# Patient Record
(demographics unavailable — no encounter records)

---

## 2024-11-27 NOTE — HISTORY OF PRESENT ILLNESS
[FreeTextEntry1] : Patient presents for followup on hypertension/hyperlipidemia/ASHD/PAF. Patient is currently on benazepril/Nifedipine/metopolol for hypertension, on Crestor for his hyperlipidemia, is trying to control/improve his sugar dietarily, is on ASA for ASHD and on Eliquis for PAF. -s/p BCC removal -turned 71 y/o today

## 2024-11-27 NOTE — ASSESSMENT
[FreeTextEntry1] : HD Flu vaccine given without side effects or reaction.Venipuncture done in our office, Labs sent out and further recommendations will be made based on lab results.Patient advised to continue present medications with diet/exercise and specialist followup.Patient will return to the office in 3-4months   discussed Shingrix/TDAP with patient, +got covid vaccines colonoscopy 1/2022 with next in 3 years endoscopy 1/2022 specialists include.. 1. cardiology-Dr. Pineda 2. podiatry-Dr. Lamb 3.vascular-Dr. mercado 4.derm-Dr. Jasbir ngo 5. ophthalmology-Site MD 6.orthopedic p.r.n.-Dr. Alvarez 7.Endocrine-Dr. Hutton 8.GI-Dr. Blandon 9.Cadiothoracic=Dr. Simons Carotid sonogram=DENNIS via vascular CIGS "+1/2 pack Q week-"/ETOH=none last CT of the chest was 6/2024 with follow-up in 6 months, jennifer ordering repeat CT chest 4/18 hep c screen Abdominal sonogram 6/2024 with fatty liver 24 hour urine 12/2022 echo via cardio

## 2025-02-10 NOTE — DATA REVIEWED
[FreeTextEntry1] : Independent review of imaging and independent interpretation was performed at today's visit. -1/22/25 CT chest lung cancer screening short term follow up through [facility]

## 2025-02-10 NOTE — DATA REVIEWED
[FreeTextEntry1] : Independent review of imaging and independent interpretation was performed at today's visit. -1/22/25 CT chest lung cancer screening short term follow up through [facility]

## 2025-02-10 NOTE — HISTORY OF PRESENT ILLNESS
[FreeTextEntry1] : Mr. PINA is a 71-year-old male referred by Dr. Rangel who presents for follow up visit regarding lung nodules. At our last visit 7/22/24, it was recommended to obtain follow up CT chest in 6 months which he presents today to review.   Past medical history includes ASHD S/P CAD stenting, PVD (especially right leg where he has had bypass and stents which are occluded and has had a toe amputation) and carotid disease, atrial fibrillation (Eliquis), current everyday smoker, subclavian steal syndrome, anemia, and fatty liver.  6/26/24 CT chest lung cancer screening through [facility] -Since 5/22/2023, there is a new 1.2 x 1.3 cm right upper lobe groundglass opacity. In addition, there is a new 0.5 x 0.8 cm right upper lobe opacity, In addition, there is a new left upper lobe 0.7 x 0.8 mm groundglass opacity.  -There are additional nodules, some which are calcified, and opacities which are unchanged.  -The largest nodules measure, in left lower lobe is a 3 mm noncalcified nodule, in the right upper lobe is a 2 mm noncalcified nodule, an additional right upper lobe nodule not visualized.  1/22/25 CT chest lung cancer screening short term follow up through [facility] -Previously described 0.8 x 0.7 cm left upper lobe groundglass nodule now measures approximately 1.1 x 0.7 cm with new 0.3 cm solid component -Unchanged previously described right upper lobe 1.2 cm groundglass nodular opacity (series 4, image 39). -No significant change in right upper lobe approximate 0.9 x 0.5 cm nodular opacity (series 4, image 48). -Unchanged additional previously seen pulmonary nodules, calcified granulomas.  Today, Mr. Pina reports that he is feeling well; he denies chest pain, palpitations, shortness of breath, cough, dysphagia, nausea/vomiting, fevers, chills, fatigue, unintentional weight loss or gain, and night sweats.

## 2025-02-10 NOTE — HISTORY OF PRESENT ILLNESS
[FreeTextEntry1] : Mr. PINA is a 71-year-old male referred by Dr. Rangel who presents for follow up visit regarding lung nodules. At our last visit 7/22/24, it was recommended to obtain follow up CT chest in 6 months which he presents today to review.   Past medical history includes ASHD S/P CAD stenting, PVD (especially right leg where he has had bypass and stents which are occluded and has had a toe amputation) and carotid disease, atrial fibrillation (Eliquis), current everyday smoker, subclavian steal syndrome, anemia, and fatty liver.  6/26/24 CT chest lung cancer screening through [facility] [employer] -Since 5/22/2023, there is a new 1.2 x 1.3 cm right upper lobe groundglass opacity. In addition, there is a new 0.5 x 0.8 cm right upper lobe opacity, In addition, there is a new left upper lobe 0.7 x 0.8 mm groundglass opacity.  -There are additional nodules, some which are calcified, and opacities which are unchanged.  -The largest nodules measure, in left lower lobe is a 3 mm noncalcified nodule, in the right upper lobe is a 2 mm noncalcified nodule, an additional right upper lobe nodule not visualized.  1/22/25 CT chest lung cancer screening short term follow up through [facility] -Previously described 0.8 x 0.7 cm left upper lobe groundglass nodule now measures approximately 1.1 x 0.7 cm with new 0.3 cm solid component -Unchanged previously described right upper lobe 1.2 cm groundglass nodular opacity (series 4, image 39). -No significant change in right upper lobe approximate 0.9 x 0.5 cm nodular opacity (series 4, image 48). -Unchanged additional previously seen pulmonary nodules, calcified granulomas.  Today, Mr. Pina reports that he is feeling well; he denies chest pain, palpitations, shortness of breath, cough, dysphagia, nausea/vomiting, fevers, chills, fatigue, unintentional weight loss or gain, and night sweats.

## 2025-02-10 NOTE — ASSESSMENT
[FreeTextEntry1] : Mo is a 72-year-old male with a patchy opacity in the right upper lobe that was new from a prior scan and has remained changed since his last.  In addition a small groundglass opacity in the left upper lobe has progressed mildly.  These nodules were discussed with radiology and a 3-month CAT scan was recommended.  If they persist a PET scan will be necessary.  Thank you for allowing me to participate in the care of your patient.  30 minutes was spent during this encounter.  Pipo Simons MD Department of Cardiovascular and Thoracic Surgery  [employer] at [facility]

## 2025-02-10 NOTE — ASSESSMENT
[FreeTextEntry1] : Mo is a 72-year-old male with a patchy opacity in the right upper lobe that was new from a prior scan and has remained changed since his last.  In addition a small groundglass opacity in the left upper lobe has progressed mildly.  These nodules were discussed with radiology and a 3-month CAT scan was recommended.  If they persist a PET scan will be necessary.  Thank you for allowing me to participate in the care of your patient.  30 minutes was spent during this encounter.  Pipo Simons MD Department of Cardiovascular and Thoracic Surgery  [employer] at [facility]

## 2025-02-24 NOTE — REVIEW OF SYSTEMS
[Negative] : Heme/Lymph Bilobed Flap Text: The defect edges were debeveled with a #15 scalpel blade.  Given the location of the defect and the proximity to free margins a bilobe flap was deemed most appropriate.  Using a sterile surgical marker, an appropriate bilobe flap drawn around the defect.    The area thus outlined was incised deep to adipose tissue with a #15 scalpel blade.  The skin margins were undermined to an appropriate distance in all directions utilizing iris scissors.

## 2025-02-26 NOTE — ASSESSMENT
[FreeTextEntry1] : Venipuncture done in our office, Labs sent out and further recommendations will be made based on lab results.Patient advised to continue present medications with diet/exercise and specialist followup.Patient will return to the office in 3-4months for CP   discussed Shingrix/TDAP with patient, +got covid vaccines colonoscopy 1/2022 with next in 3 years "to do" endoscopy 1/2022 specialists include.. 1. cardiology-Dr. Pineda 2. podiatry-Dr. Lamb 3.vascular-Dr. mercado 4.derm-Dr. Jasbir ngo 5. ophthalmology-Site MD 6.orthopedic p.r.n.-Dr. Alvarez 7.Endocrine-Dr. Hutton 8.GI-Dr. Blandon 9.Cadiothoracic=Dr. Simons Carotid sonogram=DENNIS via vascular CIGS "+1/2 pack Q week-"/ETOH=none last CT of the chest was 1/2025 with rec F/U in 3months per Dr. Simons 4/18 hep c screen Abdominal sonogram 6/2024 with fatty liver 24 hour urine 12/2022 echo via cardio

## 2025-02-26 NOTE — HISTORY OF PRESENT ILLNESS
[FreeTextEntry1] : Patient presents for followup on hypertension/hyperlipidemia/ASHD/PAF. Patient is currently on benazepril/Nifedipine/metopolol for hypertension, on Crestor for his hyperlipidemia, is trying to control/improve his sugar dietarily, is on ASA for ASHD and on Eliquis for PAF. -feels well -had CT chest, cardiothoracic recommended follow-up in 3 months

## 2025-06-08 NOTE — COUNSELING
[Cessation strategies including cessation program discussed] : Cessation strategies including cessation program discussed [Use of nicotine replacement therapies and other medications discussed] : Use of nicotine replacement therapies and other medications discussed [No] : Not willing to quit smoking [Healthy eating counseling provided] : healthy eating [Activity counseling provided] : activity [Discussed Risks and Advised to Quit Smoking] : Discussed risks and advised to quit smoking [Quit Smoking] : Quit Smoking [None] : None [Needs reinforcement, provided] : Patient needs reinforcement on understanding lifestyle changes and  the steps needed to achieve self management goals and reinforcement was provided

## 2025-06-09 NOTE — REVIEW OF SYSTEMS
[As noted in HPI] : as noted in HPI [Leg Claudication] : intermittent leg claudication [Negative] : Heme/Lymph [Heart Rate Is Slow] : the heart rate was not slow [Heart Rate Is Fast] : the heart rate was not fast [Lower Ext Edema] : no extremity edema [Palpitations] : no palpitations [Chest Pain] : no chest pain

## 2025-06-09 NOTE — HISTORY OF PRESENT ILLNESS
[FreeTextEntry1] : 72-year-old male with significant cardiovascular disease including ASHD S/P CAD stenting, PVD (especially right leg where he has had bypass and stents which are occluded and has had a toe amputation) and carotid disease presents for his yearly physical.  The patient needed a revascularization of his right leg which was done January 2017 with success. Vascular followup in 2021 showing legs without change and carotids with mild right stenosis and left moderate stenosis.No change. Cardiac PET scan August 2024 negative Echocardiogram October 2023 showed normal LVEF with moderate LVH with mild MR/TR.  Also the patient had a recurrence of his atrial fib Nov 2017 where he had a cardiac catheterization showing patent LAD stent with mild-moderate disease and no further intervention needed. Then SCOTT was done with electrical cardioversion to normal sinus rhythm . Unfortunately the patient has had recurrent episodes of atrial fibrillation. He was hospitalized December 2018 with subsequent catheterization and transfer to [facility] where he had 2 stents. He again had a recurrence of atrial fibrillation February 2019 when he was hospitalized at [facility] and was cardioverted to normal sinus rhythm and is on Eliquis.  He also is on amiodarone with continued monitoring of thyroid  He is up-to-date with follow-up with cardiology but is overdue to see vascular and plans to make an appointment  He was hospitalized February 8, 2022 secondary to chest pain with cardiac catheterization showing stents patent.All medicines the same as prior to admission but amiodarone restarted.  On direct questioning the patient feels well without complaints including no chest pain/angina, shortness of breath or palpitations. No worsening of shortness of breath Positive claudication with minimal ambulation  The patient had quit smoking for 2 years and was using E cigarettes unfortunately continues smoking about 0-4 cigarettes daily. The patient's recent CAT scans are showing pulmonary nodules therefore following with thoracic surgery with most recent imaging January 2025 with 3-month follow-up recommended which patient states is scheduled.  He is compliant with his medications  The patient had had a followup stool test but was declining colonoscopy but repeat Hemoccults were positive therefore he had colonoscopy January 2022 with 5 polyps to being hyperplastic and 3 with tubular adenoma. Followup colonoscopy in 3 years.  Patient is aware that he is due for colonoscopy and plans to do in the future. Endoscopy was also done.  He denies chest pain, palpitations, shortness of breath but similar dyspnea on exertion.  Occasional cough.  He is  with no children and retired as a [employer]

## 2025-06-09 NOTE — HEALTH RISK ASSESSMENT
[Fair] : ~his/her~ current health as fair  [Very Good] : ~his/her~  mood as very good [Never (0 pts)] : Never (0 points) [No] : In the past 12 months have you used drugs other than those required for medical reasons? No [No falls in past year] : Patient reported no falls in the past year [0] : 2) Feeling down, depressed, or hopeless: Not at all (0) [PHQ-2 Negative - No further assessment needed] : PHQ-2 Negative - No further assessment needed [Current] : Current [20 or more] : 20 or more [2] : 2 [With Family] : lives with family [# of Members in Household ___] :  household currently consist of [unfilled] member(s) [On disability] : on disability [High School] : high school [] :  [# Of Children ___] : has [unfilled] children [Feels Safe at Home] : Feels safe at home [Fully functional (bathing, dressing, toileting, transferring, walking, feeding)] : Fully functional (bathing, dressing, toileting, transferring, walking, feeding) [Fully functional (using the telephone, shopping, preparing meals, housekeeping, doing laundry, using] : Fully functional and needs no help or supervision to perform IADLs (using the telephone, shopping, preparing meals, housekeeping, doing laundry, using transportation, managing medications and managing finances) [Smoke Detector] : smoke detector [Carbon Monoxide Detector] : carbon monoxide detector [Seat Belt] :  uses seat belt [Sunscreen] : uses sunscreen [Reviewed no changes] : Reviewed, no changes [Discussed at today's visit] : Advance Directives Discussed at today's visit [Designated Healthcare Proxy] : Designated healthcare proxy [Name: ___] : Health Care Proxy's Name: [unfilled]  [None] : Patient does not have any barriers to medication adherence [Yes] : Reviewed medication list for presence of high-risk medications. [NO] : No [Audit-CScore] : 0 [de-identified] : No walking [de-identified] : fair [[id number]] : 0 [Change in mental status noted] : No change in mental status noted [Sexually Active] : not sexually active [Reports changes in hearing] : Reports no changes in hearing [Reports changes in vision] : Reports no changes in vision [Reports changes in dental health] : Reports no changes in dental health [FreeTextEntry2] : Chani [AdvancecareDate] : 06/25

## 2025-06-09 NOTE — HISTORY OF PRESENT ILLNESS
[FreeTextEntry1] : 72-year-old male with significant cardiovascular disease including ASHD S/P CAD stenting, PVD (especially right leg where he has had bypass and stents which are occluded and has had a toe amputation) and carotid disease presents for his yearly physical.  The patient needed a revascularization of his right leg which was done January 2017 with success. Vascular followup in 2021 showing legs without change and carotids with mild right stenosis and left moderate stenosis.No change. Cardiac PET scan August 2024 negative Echocardiogram October 2023 showed normal LVEF with moderate LVH with mild MR/TR.  Also the patient had a recurrence of his atrial fib Nov 2017 where he had a cardiac catheterization showing patent LAD stent with mild-moderate disease and no further intervention needed. Then SCOTT was done with electrical cardioversion to normal sinus rhythm . Unfortunately the patient has had recurrent episodes of atrial fibrillation. He was hospitalized December 2018 with subsequent catheterization and transfer to [facility] where he had 2 stents. He again had a recurrence of atrial fibrillation February 2019 when he was hospitalized at [facility] and was cardioverted to normal sinus rhythm and is on Eliquis.  He also is on amiodarone with continued monitoring of thyroid  He is up-to-date with follow-up with cardiology but is overdue to see vascular and plans to make an appointment  He was hospitalized February 8, 2022 secondary to chest pain with cardiac catheterization showing stents patent.All medicines the same as prior to admission but amiodarone restarted.  On direct questioning the patient feels well without complaints including no chest pain/angina, shortness of breath or palpitations. No worsening of shortness of breath Positive claudication with minimal ambulation  The patient had quit smoking for 2 years and was using E cigarettes unfortunately continues smoking about 0-4 cigarettes daily. The patient's recent CAT scans are showing pulmonary nodules therefore following with thoracic surgery with most recent imaging January 2025 with 3-month follow-up recommended which patient states is scheduled.  He is compliant with his medications  The patient had had a followup stool test but was declining colonoscopy but repeat Hemoccults were positive therefore he had colonoscopy January 2022 with 5 polyps to being hyperplastic and 3 with tubular adenoma. Followup colonoscopy in 3 years.  Patient is aware that he is due for colonoscopy and plans to do in the future. Endoscopy was also done.  He denies chest pain, palpitations, shortness of breath but similar dyspnea on exertion.  Occasional cough.  He is  with no children and retired as a [employer]

## 2025-06-09 NOTE — DATA REVIEWED
[FreeTextEntry1] : Cardiac PET scan August 2024 = negative Carotid duplex April 2024 with mild right-sided disease and moderate left-sided disease without change. Peripheral vascular study without change.  Echo October 2023 with normal LVEF with moderate LVH and mild MR/TR Unknown

## 2025-06-09 NOTE — DATA REVIEWED
[FreeTextEntry1] : Cardiac PET scan August 2024 = negative Carotid duplex April 2024 with mild right-sided disease and moderate left-sided disease without change. Peripheral vascular study without change.  Echo October 2023 with normal LVEF with moderate LVH and mild MR/TR

## 2025-06-09 NOTE — HEALTH RISK ASSESSMENT
[Fair] : ~his/her~ current health as fair  [Very Good] : ~his/her~  mood as very good [Never (0 pts)] : Never (0 points) [No] : In the past 12 months have you used drugs other than those required for medical reasons? No [No falls in past year] : Patient reported no falls in the past year [0] : 2) Feeling down, depressed, or hopeless: Not at all (0) [PHQ-2 Negative - No further assessment needed] : PHQ-2 Negative - No further assessment needed [Current] : Current [20 or more] : 20 or more [2] : 2 [With Family] : lives with family [# of Members in Household ___] :  household currently consist of [unfilled] member(s) [On disability] : on disability [High School] : high school [] :  [# Of Children ___] : has [unfilled] children [Feels Safe at Home] : Feels safe at home [Fully functional (bathing, dressing, toileting, transferring, walking, feeding)] : Fully functional (bathing, dressing, toileting, transferring, walking, feeding) [Fully functional (using the telephone, shopping, preparing meals, housekeeping, doing laundry, using] : Fully functional and needs no help or supervision to perform IADLs (using the telephone, shopping, preparing meals, housekeeping, doing laundry, using transportation, managing medications and managing finances) [Smoke Detector] : smoke detector [Carbon Monoxide Detector] : carbon monoxide detector [Seat Belt] :  uses seat belt [Sunscreen] : uses sunscreen [Reviewed no changes] : Reviewed, no changes [Discussed at today's visit] : Advance Directives Discussed at today's visit [Designated Healthcare Proxy] : Designated healthcare proxy [Name: ___] : Health Care Proxy's Name: [unfilled]  [None] : Patient does not have any barriers to medication adherence [Yes] : Reviewed medication list for presence of high-risk medications. [NO] : No [Audit-CScore] : 0 [de-identified] : No walking [de-identified] : fair [[id number]] : 0 [Sexually Active] : not sexually active [Change in mental status noted] : No change in mental status noted [Reports changes in hearing] : Reports no changes in hearing [Reports changes in vision] : Reports no changes in vision [Reports changes in dental health] : Reports no changes in dental health [FreeTextEntry2] : Chani [AdvancecareDate] : 06/25

## 2025-06-09 NOTE — PHYSICAL EXAM
[General Appearance - Alert] : alert [General Appearance - In No Acute Distress] : in no acute distress [Sclera] : the sclera and conjunctiva were normal [PERRL With Normal Accommodation] : pupils were equal in size, round, and reactive to light [Extraocular Movements] : extraocular movements were intact [Outer Ear] : the ears and nose were normal in appearance [Oropharynx] : the oropharynx was normal [Neck Appearance] : the appearance of the neck was normal [Neck Cervical Mass (___cm)] : no neck mass was observed [Jugular Venous Distention Increased] : there was no jugular-venous distention [Thyroid Diffuse Enlargement] : the thyroid was not enlarged [Thyroid Nodule] : there were no palpable thyroid nodules [Auscultation Breath Sounds / Voice Sounds] : lungs were clear to auscultation bilaterally [Heart Rate And Rhythm] : heart rate was normal and rhythm regular [Heart Sounds] : normal S1 and S2 [Heart Sounds Gallop] : no gallops [Murmurs] : no murmurs [Heart Sounds Pericardial Friction Rub] : no pericardial rub [Edema] : there was no peripheral edema [Bowel Sounds] : normal bowel sounds [Abdomen Soft] : soft [Abdomen Tenderness] : non-tender [Abdomen Mass (___ Cm)] : no abdominal mass palpated [Patient Refused] : rectal exam was refused by the patient [Cervical Lymph Nodes Enlarged Posterior Bilaterally] : posterior cervical [Cervical Lymph Nodes Enlarged Anterior Bilaterally] : anterior cervical [Supraclavicular Lymph Nodes Enlarged Bilaterally] : supraclavicular [Axillary Lymph Nodes Enlarged Bilaterally] : axillary [Femoral Lymph Nodes Enlarged Bilaterally] : femoral [Inguinal Lymph Nodes Enlarged Bilaterally] : inguinal [No Spinal Tenderness] : no spinal tenderness [Abnormal Walk] : normal gait [Nail Clubbing] : no clubbing  or cyanosis of the fingernails [Musculoskeletal - Swelling] : no joint swelling seen [Motor Tone] : muscle strength and tone were normal [Skin Color & Pigmentation] : normal skin color and pigmentation [Skin Turgor] : normal skin turgor [] : no rash [Cranial Nerves] : cranial nerves 2-12 were intact [No Focal Deficits] : no focal deficits [Oriented To Time, Place, And Person] : oriented to person, place, and time [Impaired Insight] : insight and judgment were intact [Affect] : the affect was normal [FreeTextEntry1] : positive carotid bruit, absent peripheral pulses [Over the Past 2 Weeks, Have You Felt Down, Depressed, or Hopeless?] : 1.) Over the past 2 weeks, have you felt down, depressed, or hopeless? No [Over the Past 2 Weeks, Have You Felt Little Interest or Pleasure Doing Things?] : 2.) Over the past 2 weeks, have you felt little interest or pleasure doing things? No

## 2025-06-09 NOTE — ASSESSMENT
[Vaccines Reviewed] : Immunizations reviewed today. Please see immunization details in the vaccine log within the immunization flowsheet.  [FreeTextEntry1] : 72-year-old male with significant cardiovascular disease including CAD with multi-stenting most recently December 2018 with coronary stenting. Most recent cardiac catheterization February 2022 showing stents patent  Patient also with severe PVD having had an amputation of the toe in the past with most recent vascularization of the right leg January 2017 with success. June 2021 show carotid disease and peripheral vascular disease stable. Patient up-to-date with follow-up with cardiology but is overdue to see vascular and to schedule  Paroxysmal atrial fibrillation with recurrent episodes. Patient to continue Eliquis and amiodarone  Unfortunately the patient continues to smoking cigarettes but at a very small amount 4 cigarettes daily. Again discussed the great importance of quitting and the risks that it imposes on his cardiovascular health as well as pulmonary. Even at such a small amount of cigarettes. Most recent CAT scans of the chest showing pulmonary nodules being followed by thoracic surgery with follow-up CAT scan due and patient states is scheduled  He is to continue his present medications  More recently he had positive Hemoccults if with a colonoscopy January 2022 with 5 polyps 3 being tubular adenomas and 2 being hyperplastic. Followup colonoscopy in 3 years therefore patient is aware that he is due for follow-up colonoscopy  Influenza and COVID x 2 vaccines already received. Strongly encouraged patient to get a booster Other vaccines up-to-date other than shingles vaccine which has been discussed  Venipuncture done today in the office Followup in 3-4 months

## 2025-06-23 NOTE — ASSESSMENT
[FreeTextEntry1] : Mo is a 72-year-old male with a history of a patchy opacity in the right upper lobe which has progressed somewhat over time.  This is concerning for a possible abnormal cell carcinoma in situ.  Unfortunately he does have significant atherosclerotic disease including multiple bypasses in the legs and may represent a high risk for surgery.  I will be arranging a PET scan in the near future and further recommendations will follow.  Thank you for allowing me to participate in the care of your patient.  30 minutes was spent during this encounter.   Pipo Simons MD Department of Cardiovascular and Thoracic Surgery  [employer] at [facility]

## 2025-06-23 NOTE — DATA REVIEWED
[FreeTextEntry1] : Independent review of imaging and independent interpretation was performed at today's visit. -6/11/25 CT Chest non contrast at [facility]

## 2025-06-23 NOTE — ASSESSMENT
[FreeTextEntry1] : Mo is a 72-year-old male with a history of a patchy opacity in the right upper lobe which has progressed somewhat over time.  This is concerning for a possible abnormal cell carcinoma in situ.  Unfortunately he does have significant atherosclerotic disease including multiple bypasses in the legs and may represent a high risk for surgery.  I will be arranging a PET scan in the near future and further recommendations will follow.  Thank you for allowing me to participate in the care of your patient.  30 minutes was spent during this encounter.   Pipo Simons MD Department of Cardiovascular and Thoracic Surgery  [employer] at [facility]

## 2025-06-23 NOTE — DATA REVIEWED
[FreeTextEntry1] : Independent review of imaging and independent interpretation was performed at today's visit. -6/11/25 CT Chest non contrast at [facility]

## 2025-06-23 NOTE — HISTORY OF PRESENT ILLNESS
[FreeTextEntry1] : Mr. PINA is a 72-year-old male referred by Dr. Rangel who presents for follow up visit regarding lung nodules. At our last visit on 2/10/25, it was recommended he obtain repeat CT imaging in 3 months which he presents today to review. today he reports feeling well and offers no complaints. Denies any chest pain, dizziness, palpitations, SOB or other related symptoms.   Past medical history includes ASHD S/P CAD stenting, PVD (especially right leg where he has had bypass and stents which are occluded and has had a toe amputation) and carotid disease, atrial fibrillation (Eliquis), current everyday smoker, subclavian steal syndrome, anemia, and fatty liver.  6/26/24 CT chest lung cancer screening through [facility] -Since 5/22/2023, there is a new 1.2 x 1.3 cm right upper lobe groundglass opacity. In addition, there is a new 0.5 x 0.8 cm right upper lobe opacity, In addition, there is a new left upper lobe 0.7 x 0.8 mm groundglass opacity. -There are additional nodules, some which are calcified, and opacities which are unchanged. -The largest nodules measure, in left lower lobe is a 3 mm noncalcified nodule, in the right upper lobe is a 2 mm noncalcified nodule, an additional right upper lobe nodule not visualized.  1/22/25 CT chest lung cancer screening short term follow up through [facility] -Previously described 0.8 x 0.7 cm left upper lobe groundglass nodule now measures approximately 1.1 x 0.7 cm with new 0.3 cm solid component -Unchanged previously described right upper lobe 1.2 cm groundglass nodular opacity (series 4, image 39). -No significant change in right upper lobe approximate 0.9 x 0.5 cm nodular opacity (series 4, image 48). -Unchanged additional previously seen pulmonary nodules, calcified granulomas.  6/11/25 CT Chest non contrast at [facility] -1.6 cm stellate opacity within right upper lobe (series 4 image 36) is slightly increased and suspicious.

## 2025-06-23 NOTE — HISTORY OF PRESENT ILLNESS
[FreeTextEntry1] : Mr. PINA is a 72-year-old male referred by Dr. Rangel who presents for follow up visit regarding lung nodules. At our last visit on 2/10/25, it was recommended he obtain repeat CT imaging in 3 months which he presents today to review. today he reports feeling well and offers no complaints. Denies any chest pain, dizziness, palpitations, SOB or other related symptoms.   Past medical history includes ASHD S/P CAD stenting, PVD (especially right leg where he has had bypass and stents which are occluded and has had a toe amputation) and carotid disease, atrial fibrillation (Eliquis), current everyday smoker, subclavian steal syndrome, anemia, and fatty liver.  6/26/24 CT chest lung cancer screening through [facility] -Since 5/22/2023, there is a new 1.2 x 1.3 cm right upper lobe groundglass opacity. In addition, there is a new 0.5 x 0.8 cm right upper lobe opacity, In addition, there is a new left upper lobe 0.7 x 0.8 mm groundglass opacity. -There are additional nodules, some which are calcified, and opacities which are unchanged. -The largest nodules measure, in left lower lobe is a 3 mm noncalcified nodule, in the right upper lobe is a 2 mm noncalcified nodule, an additional right upper lobe nodule not visualized.  1/22/25 CT chest lung cancer screening short term follow up through [facility] -Previously described 0.8 x 0.7 cm left upper lobe groundglass nodule now measures approximately 1.1 x 0.7 cm with new 0.3 cm solid component -Unchanged previously described right upper lobe 1.2 cm groundglass nodular opacity (series 4, image 39). -No significant change in right upper lobe approximate 0.9 x 0.5 cm nodular opacity (series 4, image 48). -Unchanged additional previously seen pulmonary nodules, calcified granulomas.  6/11/25 CT Chest non contrast at [facility] -1.6 cm stellate opacity within right upper lobe (series 4 image 36) is slightly increased and suspicious.

## 2025-07-14 NOTE — ASSESSMENT
[FreeTextEntry1] : PREOPERATIVE CHECKLIST: (Discussed with patient) - Confirm allergies, including latex: NONE  - Confirm pacemaker: NONE - Anticoagulation/antiplatelets noted and will be discontinued/continued: Eliquis d/c 3 days prior, Maintain Aspirin 81mg  - SGLT-2 Inhibitors (discontinued 3 days prior to surgery) or GLP-1 (discontinued 1 week prior to surgery): NONE - All other supplements, NSAIDs and fish oil were discussed and will be held one week before surgery  Mo is a 72-year-old male with a patchy right upper lobe opacity that has activity by PET scan and is concerning for a slow-growing malignancy.  He does have significant vascular disease which is very life limiting at this point and is not yet clear whether he would be a surgical candidate based on the elevated risk.  I will be arranging a transthoracic needle biopsy followed by what I suspect to be either thoracoscopy and removal or SBRT.  Thank you for allowing me to participate in the care of your patient.  30 minutes was spent during this encounter.   Pipo Simons MD Department of Cardiovascular and Thoracic Surgery  [employer] at [facility]

## 2025-07-14 NOTE — DATA REVIEWED
[FreeTextEntry1] : Independent review of imaging and independent interpretation was performed at today's visit. -6/30/25 PET Scan through [facility]

## 2025-07-14 NOTE — HISTORY OF PRESENT ILLNESS
[FreeTextEntry1] : Mr. PINA is a 72-year-old male referred by Dr. Rangel who presents for follow up visit regarding lung nodules. At our last visit on 6/23/25, it was recommended he obtain a PET Scan which he presents to review today.   Past medical history includes ASHD S/P CAD stenting, PVD (especially right leg where he has had bypass and stents which are occluded and has had a toe amputation) and carotid disease, atrial fibrillation (Eliquis), current everyday smoker, subclavian steal syndrome, anemia, and fatty liver.  6/26/24 CT chest lung cancer screening through [facility] -Since 5/22/2023, there is a new 1.2 x 1.3 cm right upper lobe groundglass opacity. In addition, there is a new 0.5 x 0.8 cm right upper lobe opacity, In addition, there is a new left upper lobe 0.7 x 0.8 mm groundglass opacity. -There are additional nodules, some which are calcified, and opacities which are unchanged. -The largest nodules measure, in left lower lobe is a 3 mm noncalcified nodule, in the right upper lobe is a 2 mm noncalcified nodule, an additional right upper lobe nodule not visualized.  1/22/25 CT chest lung cancer screening short term follow up through [facility] -Previously described 0.8 x 0.7 cm left upper lobe groundglass nodule now measures approximately 1.1 x 0.7 cm with new 0.3 cm solid component -Unchanged previously described right upper lobe 1.2 cm groundglass nodular opacity (series 4, image 39). -No significant change in right upper lobe approximate 0.9 x 0.5 cm nodular opacity (series 4, image 48). -Unchanged additional previously seen pulmonary nodules, calcified granulomas.  6/11/25 CT Chest non contrast at [facility] -1.6 cm stellate opacity within right upper lobe (series 4 image 36) is slightly increased and suspicious.  6/30/25 PET Scan through [facility] -Two FDG avid nodules in the right upper lobe, concerning for malignancy. -No FDG avid lymphadenopathy. -Left lower quadrant diverticulitis.  Today, he is overall feeling well, offers no complaints.  Patient denies chest pain, palpitations, shortness of breath, cough, fevers, chills, fatigue, unintentional weight loss or gain, and night sweats.

## 2025-07-14 NOTE — DATA REVIEWED
[FreeTextEntry1] : Independent review of imaging and independent interpretation was performed at today's visit. -6/30/25 PET Scan through [facility]

## 2025-07-14 NOTE — PHYSICAL EXAM
[] : no respiratory distress [Auscultation Breath Sounds / Voice Sounds] : lungs were clear to auscultation bilaterally [Examination Of The Chest] : the chest was normal in appearance [Chest Visual Inspection Thoracic Asymmetry] : no chest asymmetry [Diminished Respiratory Excursion] : normal chest expansion [No Focal Deficits] : no focal deficits [Oriented To Time, Place, And Person] : oriented to person, place, and time [Impaired Insight] : insight and judgment were intact [Affect] : the affect was normal

## 2025-07-14 NOTE — HISTORY OF PRESENT ILLNESS
[FreeTextEntry1] : Mr. PINA is a 72-year-old male referred by Dr. Rangel who presents for follow up visit regarding lung nodules. At our last visit on 6/23/25, it was recommended he obtain a PET Scan which he presents to review today.   Past medical history includes ASHD S/P CAD stenting, PVD (especially right leg where he has had bypass and stents which are occluded and has had a toe amputation) and carotid disease, atrial fibrillation (Eliquis), current everyday smoker, subclavian steal syndrome, anemia, and fatty liver.  6/26/24 CT chest lung cancer screening through [facility] -Since 5/22/2023, there is a new 1.2 x 1.3 cm right upper lobe groundglass opacity. In addition, there is a new 0.5 x 0.8 cm right upper lobe opacity, In addition, there is a new left upper lobe 0.7 x 0.8 mm groundglass opacity. -There are additional nodules, some which are calcified, and opacities which are unchanged. -The largest nodules measure, in left lower lobe is a 3 mm noncalcified nodule, in the right upper lobe is a 2 mm noncalcified nodule, an additional right upper lobe nodule not visualized.  1/22/25 CT chest lung cancer screening short term follow up through [facility] -Previously described 0.8 x 0.7 cm left upper lobe groundglass nodule now measures approximately 1.1 x 0.7 cm with new 0.3 cm solid component -Unchanged previously described right upper lobe 1.2 cm groundglass nodular opacity (series 4, image 39). -No significant change in right upper lobe approximate 0.9 x 0.5 cm nodular opacity (series 4, image 48). -Unchanged additional previously seen pulmonary nodules, calcified granulomas.  6/11/25 CT Chest non contrast at [facility] -1.6 cm stellate opacity within right upper lobe (series 4 image 36) is slightly increased and suspicious.  6/30/25 PET Scan through [facility] -Two FDG avid nodules in the right upper lobe, concerning for malignancy. -No FDG avid lymphadenopathy. -Left lower quadrant diverticulitis.  Today, he is overall feeling well, offers no complaints.  Patient denies chest pain, palpitations, shortness of breath, cough, fevers, chills, fatigue, unintentional weight loss or gain, and night sweats.

## 2025-07-14 NOTE — ASSESSMENT
[FreeTextEntry1] : PREOPERATIVE CHECKLIST: (Discussed with patient) - Confirm allergies, including latex: NONE  - Confirm pacemaker: NONE - Anticoagulation/antiplatelets noted and will be discontinued/continued: Eliquis d/c 3 days prior, Maintain Aspirin 81mg  - SGLT-2 Inhibitors (discontinued 3 days prior to surgery) or GLP-1 (discontinued 1 week prior to surgery): NONE - All other supplements, NSAIDs and fish oil were discussed and will be held one week before surgery  Mo is a 72-year-old male with a patchy right upper lobe opacity that has activity by PET scan and is concerning for a slow-growing malignancy.  He does have significant vascular disease which is very life limiting at this point and is not yet clear whether he would be a surgical candidate based on the elevated risk.  I will be arranging a transthoracic needle biopsy followed by what I suspect to be either thoracoscopy and removal or SBRT.  Thank you for allowing me to participate in the care of your patient.  30 minutes was spent during this encounter.   Pipo Simons MD Department of Cardiovascular and Thoracic Surgery  [employer] at [facility]

## 2025-07-24 NOTE — RESULTS/DATA
[] : results reviewed [de-identified] : WNL [de-identified] : Mildly increased PT/PTT/INR [de-identified] : WNL [de-identified] : NSR with marked anterolateral T wave inversions, LVH without change [de-identified] : Hemoglobin A1c = 6 TSH = normal

## 2025-07-24 NOTE — PHYSICAL EXAM
[No Acute Distress] : no acute distress [No JVD] : no jugular venous distention [No Respiratory Distress] : no respiratory distress  [No Accessory Muscle Use] : no accessory muscle use [Clear to Auscultation] : lungs were clear to auscultation bilaterally [Normal Rate] : normal rate  [Regular Rhythm] : with a regular rhythm [No Carotid Bruits] : no carotid bruits [No Edema] : there was no peripheral edema [Soft] : abdomen soft [Non Tender] : non-tender [Non-distended] : non-distended [No Masses] : no abdominal mass palpated [No HSM] : no HSM [No Focal Deficits] : no focal deficits [Normal Affect] : the affect was normal [de-identified] : Diffuse decreased breath sounds

## 2025-07-24 NOTE — ASSESSMENT
[Modify anticoagulant treatment prior to procedure] : Modify anticoagulant treatment prior to procedure [Modify anti-platelet treatment prior to procedure] : Modify anti-platelet treatment prior to procedure [Continue medications as is] : Continue current medications [FreeTextEntry4] : 72-year-old male with extensive medical history especially cardiovascular currently medically stable with no indication of cardiopulmonary decompensation. Patient seen by cardiology preoperatively and cleared for upcoming procedure. Instructed to hold Eliquis 2 to 3 days prior to the procedure and aspirin 5 days prior to the procedure. [FreeTextEntry5] : Hold Eliquis 2 to 3 days prior to the procedure [FreeTextEntry6] : Hold aspirin 5 days prior to the procedure

## 2025-07-24 NOTE — HISTORY OF PRESENT ILLNESS
[Aortic Stenosis] : no aortic stenosis [Atrial Fibrillation] : atrial fibrillation [Coronary Artery Disease] : coronary artery disease [Recent Myocardial Infarction] : no recent myocardial infarction [Implantable Device/Pacemaker] : no implantable device/pacemaker [Asthma] : no asthma [COPD] : COPD [Sleep Apnea] : no sleep apnea [Smoker] : smoker [No Adverse Anesthesia Reaction] : no adverse anesthesia reaction in self or family member [Chronic Anticoagulation] : chronic anticoagulation [Chronic Kidney Disease] : no chronic kidney disease [Diabetes] : no diabetes [(Patient denies any chest pain, claudication, dyspnea on exertion, orthopnea, palpitations or syncope)] : Patient denies any chest pain, claudication, dyspnea on exertion, orthopnea, palpitations or syncope [Moderate (4-6 METs)] : Moderate (4-6 METs) [FreeTextEntry1] : CT-guided right upper lobe lung mass biopsy [FreeTextEntry2] : July 28, 2025 [FreeTextEntry3] : Dr Simons [FreeTextEntry4] : 72-year-old male with extensive medical history presents for medical evaluation prior to CT-guided right upper lobe lung mass biopsy.  History includes ASHD status post stenting, PVD especially of his right leg for which he advised past and stents which are occluded and he had a toe amputation as well as carotid disease with mild right stenosis and left moderate stenosis. History of atrial fibrillation for which he is on Eliquis.  Maintained on amiodarone. Also hypertension and hyperlipidemia on medication Also COPD with patient smoking from 0-4 cigarettes daily.  Generally feeling well without complaints including no chest pain, palpitations, shortness of breath or edema. [FreeTextEntry7] : Cardiac PET scan August 2024 with normal LVEF with no evidence of ischemia or infarct Echo February 2024 with normal LVEF with mild to moderate MR/TR

## 2025-07-24 NOTE — RESULTS/DATA
[] : results reviewed [de-identified] : WNL [de-identified] : Mildly increased PT/PTT/INR [de-identified] : WNL [de-identified] : NSR with marked anterolateral T wave inversions, LVH without change [de-identified] : Hemoglobin A1c = 6 TSH = normal

## 2025-07-29 NOTE — HISTORY OF PRESENT ILLNESS
[Current] : current [Former] : former [Never] : never [TextBox_4] : 7/29/2025 71y/o male [place] in [place]   current smoker  (  started 19y/o - one pack day  now  4cig/day  ex vaping)   work   construction      h/o  CAD- stents x four    PVD   bypass ( arm veins)  right leg       here for   lung nodules  emphysema on ct   -followed by Dr Simons    was to have  bx lung however canceled  because was on aspirin  - will be rescheduled - denies sob no cough no wheezing -  discussed inhalers  refuses  -   - can ambulate  only few  steps due to leg bypass  6/26/24 CT chest lung cancer screening through [facility] -Since 5/22/2023, there is a new 1.2 x 1.3 cm right upper lobe groundglass opacity. In addition, there is a new 0.5 x 0.8 cm right upper lobe opacity, In addition, there is a new left upper lobe 0.7 x 0.8 mm groundglass opacity. -There are additional nodules, some which are calcified, and opacities which are unchanged. -The largest nodules measure, in left lower lobe is a 3 mm noncalcified nodule, in the right upper lobe is a 2 mm noncalcified nodule, an additional right upper lobe nodule not visualized.  1/22/25 CT chest lung cancer screening short term follow up through [facility] -Previously described 0.8 x 0.7 cm left upper lobe groundglass nodule now measures approximately 1.1 x 0.7 cm with new 0.3 cm solid component -Unchanged previously described right upper lobe 1.2 cm groundglass nodular opacity (series 4, image 39). -No significant change in right upper lobe approximate 0.9 x 0.5 cm nodular opacity (series 4, image 48). -Unchanged additional previously seen pulmonary nodules, calcified granulomas.  6/11/25 CT Chest non contrast at [facility] -1.6 cm stellate opacity within right upper lobe (series 4 image 36) is slightly increased and suspicious.  6/30/25 PET Scan through [facility] -Two FDG avid nodules in the right upper lobe, concerning for malignancy. -No FDG avid lymphadenopathy. -Left lower quadrant diverticulitis. -

## 2025-07-29 NOTE — COUNSELING
[Yes] : Risk of tobacco use and health benefits of smoking cessation discussed: Yes [Cessation strategies including cessation program discussed] : Cessation strategies including cessation program discussed [Use of nicotine replacement therapies and other medications discussed] : Use of nicotine replacement therapies and other medications discussed [Smoking Cessation Program Referral] : Smoking Cessation Program Referral  [FreeTextEntry1] : 5

## 2025-07-29 NOTE — ASSESSMENT
[FreeTextEntry1] : 71y/o male  1- current smoker   > 20 pack years    COPD emphysema 2- PET  6/2025  two   FDG   RUL nodules    concerning  for malignancy  3- refuses  inhalers 4- vaccinatino per primary   recommendations 1- PFT 2- [place] quits discussed and    nicotine replacement 3- f/u  Dr Simons    agreement on plan  -imaging reviewed   - smoking cessation    emphysema prevention and    dx

## 2025-07-29 NOTE — ASSESSMENT
[FreeTextEntry1] : 73y/o male  1- current smoker   > 20 pack years    COPD emphysema 2- PET  6/2025  two   FDG   RUL nodules    concerning  for malignancy  3- refuses  inhalers 4- vaccinatino per primary   recommendations 1- PFT 2- [place] quits discussed and    nicotine replacement 3- f/u  Dr Simons    agreement on plan  -imaging reviewed   - smoking cessation    emphysema prevention and    dx

## 2025-07-29 NOTE — REVIEW OF SYSTEMS
[Seasonal Allergies] : seasonal allergies [Chronic Pain] : chronic pain [Fever] : no fever [Recent Wt Gain (___ Lbs)] : ~T no recent weight gain [Chills] : no chills [Recent Wt Loss (___ Lbs)] : ~T no recent weight loss [Epistaxis] : no epistaxis [Sore Throat] : no sore throat [Nasal Congestion] : no nasal congestion [Postnasal Drip] : no postnasal drip [Sinus Problems] : no sinus problems [Cough] : no cough [Hemoptysis] : no hemoptysis [Sputum] : no sputum [Dyspnea] : no dyspnea [Wheezing] : no wheezing [SOB on Exertion] : no sob on exertion [Chest Discomfort] : no chest discomfort [Palpitations] : no palpitations [GERD] : no gerd [Abdominal Pain] : no abdominal pain [Nausea] : no nausea [Vomiting] : no vomiting [Dysphagia] : no dysphagia [Bleeding] : no bleeding [Rash] : no rash [Blood Transfusion] : no blood transfusion [Clotting Disorder/ Frequent bleeding] : no clotting disorder/ frequent bleeding [Diabetes] : no diabetes [Thyroid Problem] : no thyroid problem [Obesity] : no obesity

## 2025-07-29 NOTE — PHYSICAL EXAM
[IV] : Mallampati Class: IV [Normal Appearance] : normal appearance [Supple] : supple [No Neck Mass] : no neck mass [No JVD] : no jvd [Normal Rate/Rhythm] : normal rate/rhythm [Normal S1, S2] : normal s1, s2 [No Murmurs] : no murmurs [No Resp Distress] : no resp distress [No Acc Muscle Use] : no acc muscle use [Clear to Auscultation Bilaterally] : clear to auscultation bilaterally [Benign] : benign [No HSM] : no hsm [Normal Gait] : normal gait [No Clubbing] : no clubbing [No Edema] : no edema [No Rash] : no rash [No Motor Deficits] : no motor deficits [Normal Affect] : normal affect [TextBox_2] : pleasant  male no distress no cough no sob [TextBox_11] : no lesion moist

## 2025-07-29 NOTE — HISTORY OF PRESENT ILLNESS
[Current] : current [Former] : former [Never] : never [TextBox_4] : 7/29/2025 73y/o male born in [place]   current smoker  (  started 19y/o - one pack day  now  4cig/day  ex vaping)   work   construction      h/o  CAD- stents x four    PVD   bypass ( arm veins)  right leg       here for   lung nodules  emphysema on ct   -followed by Dr Simons    was to have  [facility] however canceled  because was on aspirin  - will be rescheduled - denies sob no cough no wheezing -  discussed inhalers  refuses  -   - can ambulate  only few  steps due to leg bypass  6/26/24 CT chest lung cancer screening through [facility] -Since 5/22/2023, there is a new 1.2 x 1.3 cm right upper lobe groundglass opacity. In addition, there is a new 0.5 x 0.8 cm right upper lobe opacity, In addition, there is a new left upper lobe 0.7 x 0.8 mm groundglass opacity. -There are additional nodules, some which are calcified, and opacities which are unchanged. -The largest nodules measure, in left lower lobe is a 3 mm noncalcified nodule, in the right upper lobe is a 2 mm noncalcified nodule, an additional right upper lobe nodule not visualized.  1/22/25 CT chest lung cancer screening short term follow up through [facility] -Previously described 0.8 x 0.7 cm left upper lobe groundglass nodule now measures approximately 1.1 x 0.7 cm with new 0.3 cm solid component -Unchanged previously described right upper lobe 1.2 cm groundglass nodular opacity (series 4, image 39). -No significant change in right upper lobe approximate 0.9 x 0.5 cm nodular opacity (series 4, image 48). -Unchanged additional previously seen pulmonary nodules, calcified granulomas.  6/11/25 CT Chest non contrast at [facility] -1.6 cm stellate opacity within right upper lobe (series 4 image 36) is slightly increased and suspicious.  6/30/25 PET Scan through [facility] -Two FDG avid nodules in the right upper lobe, concerning for malignancy. -No FDG avid lymphadenopathy. -Left lower quadrant diverticulitis. -